# Patient Record
Sex: FEMALE | Race: WHITE | NOT HISPANIC OR LATINO | URBAN - METROPOLITAN AREA
[De-identification: names, ages, dates, MRNs, and addresses within clinical notes are randomized per-mention and may not be internally consistent; named-entity substitution may affect disease eponyms.]

---

## 2023-07-25 ENCOUNTER — HOSPITAL ENCOUNTER (OUTPATIENT)
Dept: DATA CONVERSION | Facility: HOSPITAL | Age: 48
Discharge: HOME | End: 2023-07-26
Attending: EMERGENCY MEDICINE

## 2023-07-25 DIAGNOSIS — R05.9 COUGH, UNSPECIFIED: ICD-10-CM

## 2023-07-25 DIAGNOSIS — R91.8 OTHER NONSPECIFIC ABNORMAL FINDING OF LUNG FIELD: ICD-10-CM

## 2023-07-25 DIAGNOSIS — R09.81 NASAL CONGESTION: ICD-10-CM

## 2023-07-25 DIAGNOSIS — U07.1 COVID-19: Primary | ICD-10-CM

## 2023-07-25 LAB
EUA DISCLAIMER: ABNORMAL
FLUAV RNA NPH QL NAA+PROBE: NEGATIVE
FLUBV RNA NPH QL NAA+PROBE: NEGATIVE
SARS-COV-2 RNA SPEC QL NAA+PROBE: ABNORMAL

## 2023-08-23 PROBLEM — G43.909 MIGRAINE: Status: ACTIVE | Noted: 2023-08-23

## 2023-08-23 PROBLEM — J44.9 COPD (CHRONIC OBSTRUCTIVE PULMONARY DISEASE) (MULTI): Status: ACTIVE | Noted: 2023-08-23

## 2023-08-23 PROBLEM — F41.9 ANXIETY: Status: ACTIVE | Noted: 2023-08-23

## 2023-08-23 PROBLEM — K92.2 GASTROINTESTINAL HEMORRHAGE: Status: ACTIVE | Noted: 2023-08-23

## 2023-08-23 PROBLEM — N91.2 AMENORRHEA: Status: ACTIVE | Noted: 2023-08-23

## 2023-08-23 PROBLEM — K29.60 EROSIVE GASTRITIS: Status: ACTIVE | Noted: 2023-08-23

## 2023-08-23 PROBLEM — H52.10 HIGH MYOPIA: Status: ACTIVE | Noted: 2023-08-23

## 2023-08-23 PROBLEM — I10 ESSENTIAL HYPERTENSION: Status: ACTIVE | Noted: 2023-08-23

## 2023-08-23 PROBLEM — R09.89 ALTERED CARDIOPULMONARY TISSUE PERFUSION: Status: ACTIVE | Noted: 2023-08-23

## 2023-08-23 PROBLEM — N96 RECURRENT PREGNANCY LOSS WITHOUT CURRENT PREGNANCY: Status: ACTIVE | Noted: 2023-08-23

## 2023-08-23 PROBLEM — R91.8 INFILTRATE OF LUNG PRESENT ON IMAGING STUDY: Status: ACTIVE | Noted: 2023-08-23

## 2023-08-23 PROBLEM — R06.03 RESPIRATORY DISTRESS: Status: ACTIVE | Noted: 2023-08-23

## 2023-08-23 PROBLEM — H04.123 BILATERAL DRY EYES: Status: ACTIVE | Noted: 2023-08-23

## 2023-08-23 PROBLEM — D64.9 ANEMIA: Status: ACTIVE | Noted: 2023-08-23

## 2023-08-23 PROBLEM — R55 VASOVAGAL SYNCOPE: Status: ACTIVE | Noted: 2023-08-23

## 2023-08-23 PROBLEM — N39.3 STRESS INCONTINENCE OF URINE: Status: ACTIVE | Noted: 2023-08-23

## 2023-08-23 RX ORDER — RAMIPRIL 10 MG/1
CAPSULE ORAL
COMMUNITY
Start: 2013-11-24 | End: 2023-10-13 | Stop reason: ALTCHOICE

## 2023-08-23 RX ORDER — DICYCLOMINE HYDROCHLORIDE 10 MG/1
2 CAPSULE ORAL 3 TIMES DAILY
COMMUNITY
End: 2023-10-13 | Stop reason: ALTCHOICE

## 2023-08-23 RX ORDER — IPRATROPIUM BROMIDE AND ALBUTEROL SULFATE 2.5; .5 MG/3ML; MG/3ML
SOLUTION RESPIRATORY (INHALATION) 4 TIMES DAILY PRN
COMMUNITY
Start: 2021-11-19 | End: 2023-10-13 | Stop reason: ALTCHOICE

## 2023-08-23 RX ORDER — ALBUTEROL SULFATE 90 UG/1
2 AEROSOL, METERED RESPIRATORY (INHALATION) 4 TIMES DAILY PRN
COMMUNITY
End: 2024-04-05 | Stop reason: WASHOUT

## 2023-08-23 RX ORDER — IBUPROFEN 600 MG/1
1 TABLET ORAL EVERY 8 HOURS PRN
COMMUNITY

## 2023-08-23 RX ORDER — ASCORBIC ACID 500 MG
TABLET ORAL
COMMUNITY
Start: 2023-04-21

## 2023-08-23 RX ORDER — HYDROCHLOROTHIAZIDE 25 MG/1
TABLET ORAL
COMMUNITY
Start: 2013-11-06 | End: 2023-10-13 | Stop reason: ALTCHOICE

## 2023-08-23 RX ORDER — MELOXICAM 7.5 MG/1
TABLET ORAL
COMMUNITY
Start: 2014-01-14 | End: 2023-10-13 | Stop reason: ALTCHOICE

## 2023-08-23 RX ORDER — LORATADINE 10 MG/1
1 TABLET ORAL DAILY
COMMUNITY
End: 2023-08-23 | Stop reason: SDUPTHER

## 2023-08-23 RX ORDER — AZITHROMYCIN 250 MG/1
1 TABLET, FILM COATED ORAL DAILY
COMMUNITY
End: 2023-10-13 | Stop reason: ALTCHOICE

## 2023-08-23 RX ORDER — PANTOPRAZOLE SODIUM 40 MG/1
1 TABLET, DELAYED RELEASE ORAL DAILY
COMMUNITY
Start: 2023-03-21

## 2023-08-23 RX ORDER — LISINOPRIL 20 MG/1
1 TABLET ORAL DAILY
COMMUNITY
End: 2023-10-13 | Stop reason: SDUPTHER

## 2023-08-23 RX ORDER — NIRMATRELVIR AND RITONAVIR 300-100 MG
1 KIT ORAL 2 TIMES DAILY
COMMUNITY
End: 2023-10-13 | Stop reason: ALTCHOICE

## 2023-08-23 RX ORDER — PEN NEEDLE, DIABETIC 31 GX5/16"
NEEDLE, DISPOSABLE MISCELLANEOUS
COMMUNITY
Start: 2021-11-19 | End: 2023-10-13 | Stop reason: ALTCHOICE

## 2023-08-23 RX ORDER — LORATADINE 10 MG/1
1 TABLET ORAL DAILY
COMMUNITY

## 2023-10-08 NOTE — PROGRESS NOTES
HPI:       Hypertension:          The patient has no complaints.  Labwork done this year all normal         The patients cardiovascular risk factors include:         Cardiac Risk Factors  Age > 45-male, > 55-female:  NO   Smoking:   NO   Sig. family hx of CHD*:  NO   Hypertension:   YES  +1   Diabetes:   NO   HDL < 35:   NO   HDL > 59:   NO   Total: 1     *- Sig. family h/o CHD per NCEP = MI or sudden death at <56yo in   father or other 1st-degree male relative, or <66yo in mother or   other 1st-degree female relative           The patients adherence to the treatment regimen is Excellent         Responses to the medications has been Excellent    Hyperlipidemia:   The patient does not use medications that may worsen dyslipidemias (corticosteroids, progestins, anabolic steroids, diuretics, beta-blockers, amiodarone, cyclosporine, olanzapine). The patient exercises intermittently.  The patient is not known to have coexisting coronary artery disease.    MEDICATIONS:   Current Outpatient Medications   Medication Sig Dispense Refill    ascorbic acid (Vitamin C) 500 mg tablet Super C Complex      ibuprofen 600 mg tablet Take 1 tablet (600 mg) by mouth every 8 hours if needed (pain).      loratadine (Claritin) 10 mg tablet Take 1 tablet (10 mg) by mouth once daily. For 30 days.      pantoprazole (ProtoNix) 40 mg EC tablet Take 1 tablet (40 mg) by mouth once daily. 30 min before 1st meal of the day.  For 90 days.      albuterol 90 mcg/actuation inhaler Inhale 2 puffs 4 times a day as needed for shortness of breath.      ASPIRIN ORAL 81 mg tabs.      lisinopril 20 mg tablet Take 1 tablet (20 mg) by mouth once daily. For 90 days. 90 tablet 1     No current facility-administered medications for this visit.     ALLERGIES:   Allergies   Allergen Reactions    Albuterol Sulfate Other     Other reaction: shakiness     MEDICAL HISTORY:   Past Medical History:   Diagnosis Date    COVID-19 long hauler manifesting chronic dyspnea      hypoxemia from 9/2021 to 6/2022 requiring O2.    Erosive gastritis     Essential hypertension     History of pulmonary embolus (PE) 10/2022    from covid 19 while in hospital    Migraines     Pneumonia due to COVID-19 virus 07/2023    did not need hospitalization    Prediabetes     Stress incontinence      FAMILY HISTORY:   Family History   Problem Relation Name Age of Onset    Hypertension Mother      Diabetes Mother      Colon polyps Father          benign    Other (diagnosed with Other conditions influening health) Father      Asthma Brother      No Known Problems Daughter      No Known Problems Daughter      No Known Problems Daughter      No Known Problems Son      Breast cancer Paternal Grandmother      Prostate cancer Paternal Grandfather      Heart attack Other paternal uncle     Cancer Neg Hx          No new cases of breast cancer or gyn cancer or colon cancer in family since last exam.     SOCIAL HISTORY:   Social History     Tobacco Use    Smoking status: Never     Passive exposure: Past    Smokeless tobacco: Never   Vaping Use    Vaping Use: Never used   Substance Use Topics    Alcohol use: Yes     Alcohol/week: 2.0 standard drinks of alcohol     Types: 2 Shots of liquor per week    Drug use: Never         ROS:      CONSTITUTION:  alert and oriented     OPHTHALMOLOGY:          no Change in vision.         CARDIOLOGY:          no Chest pain.  no Dyspnea on exertion.  no Shortness of breath.         ENDOCRINOLOGY:          no Excessive thirst.  no Excessive urination.  no Fatigue.  no Skin changes.  no Weight gain.  no Weight loss.         SKIN:          no Healing problems.         NEUROLOGY:          no Loss of sensation in specific body area.  no Burning pain in feet.  no Peripheral neuropathy.  no Tingling/numbness.    LABS:   Lab Results   Component Value Date    GLUCOSE 120 (H) 07/24/2023    CALCIUM 9.1 04/21/2023     04/21/2023    K 4.1 04/21/2023    CO2 26 04/21/2023     04/21/2023     BUN 13 04/21/2023    CREATININE 0.9 04/21/2023      Lab Results   Component Value Date    CHOL 190 04/21/2023    CHOL 166 09/30/2019     Lab Results   Component Value Date    HDL 47 (L) 04/21/2023    HDL 45 (L) 09/30/2019     Lab Results   Component Value Date    LDLCALC 113 04/21/2023    LDLCALC 94 09/30/2019     Lab Results   Component Value Date    TRIG 151 (H) 04/21/2023    TRIG 137 09/30/2019     Lab Results   Component Value Date    ALBUR 14 04/21/2023      Lab Results   Component Value Date    HGBA1C 6.2 (H) 07/24/2023         VITAL SIGNS:  /84   Pulse 65   Wt 93.5 kg (206 lb 3.2 oz)   LMP  (Within Months)   SpO2 96%   BMI 36.53 kg/m²   General Appearance: awake, alert, oriented, in no acute distress  Lungs: Normal expansion.  Clear to auscultation.  No rales, rhonchi, or wheezing.  Heart: Heart sounds are normal.  Regular rate and rhythm without murmur, gallop or rub.  Extremities: Extremities warm to touch, pink, with no edema.  Neurologic: Alert and oriented x 3, gait normal., reflexes normal and symmetric, strength and  sensation grossly normal    Pat was seen today for hypertension.  Diagnoses and all orders for this visit:  Primary hypertension (Primary)  -     lisinopril 20 mg tablet; Take 1 tablet (20 mg) by mouth once daily. For 90 days.  -     Follow Up In Primary Care - Established; Future  Prediabetes  Comments:  cont with diet and exercise  Encounter for screening mammogram for malignant neoplasm of breast  Comments:  also needs pap smear and will f/u for that  Orders:  -     BI mammo bilateral screening tomosynthesis; Future

## 2023-10-13 ENCOUNTER — OFFICE VISIT (OUTPATIENT)
Dept: PRIMARY CARE | Facility: CLINIC | Age: 48
End: 2023-10-13
Payer: COMMERCIAL

## 2023-10-13 VITALS
BODY MASS INDEX: 36.53 KG/M2 | HEART RATE: 65 BPM | OXYGEN SATURATION: 96 % | DIASTOLIC BLOOD PRESSURE: 84 MMHG | WEIGHT: 206.2 LBS | SYSTOLIC BLOOD PRESSURE: 118 MMHG

## 2023-10-13 DIAGNOSIS — Z12.31 ENCOUNTER FOR SCREENING MAMMOGRAM FOR MALIGNANT NEOPLASM OF BREAST: ICD-10-CM

## 2023-10-13 DIAGNOSIS — R73.03 PREDIABETES: ICD-10-CM

## 2023-10-13 DIAGNOSIS — I10 PRIMARY HYPERTENSION: Primary | ICD-10-CM

## 2023-10-13 PROCEDURE — 1036F TOBACCO NON-USER: CPT | Performed by: FAMILY MEDICINE

## 2023-10-13 PROCEDURE — 3074F SYST BP LT 130 MM HG: CPT | Performed by: FAMILY MEDICINE

## 2023-10-13 PROCEDURE — 3079F DIAST BP 80-89 MM HG: CPT | Performed by: FAMILY MEDICINE

## 2023-10-13 PROCEDURE — 99214 OFFICE O/P EST MOD 30 MIN: CPT | Performed by: FAMILY MEDICINE

## 2023-10-13 RX ORDER — LISINOPRIL 20 MG/1
20 TABLET ORAL DAILY
Qty: 90 TABLET | Refills: 1 | Status: SHIPPED | OUTPATIENT
Start: 2023-10-13

## 2023-10-13 ASSESSMENT — PATIENT HEALTH QUESTIONNAIRE - PHQ9
SUM OF ALL RESPONSES TO PHQ9 QUESTIONS 1 AND 2: 2
2. FEELING DOWN, DEPRESSED OR HOPELESS: SEVERAL DAYS
1. LITTLE INTEREST OR PLEASURE IN DOING THINGS: SEVERAL DAYS
10. IF YOU CHECKED OFF ANY PROBLEMS, HOW DIFFICULT HAVE THESE PROBLEMS MADE IT FOR YOU TO DO YOUR WORK, TAKE CARE OF THINGS AT HOME, OR GET ALONG WITH OTHER PEOPLE: NOT DIFFICULT AT ALL

## 2023-10-23 NOTE — PROGRESS NOTES
BREAST & [facility]YN.  HPI:     GYN History:          Menarche at age 12 years         Menses last menstrual period in July 2023...not menopausal yet         Menopause not  yet         H/o STD No.          H/o abnormal PAP no         Contraception Nothing         Vasomotor symptoms decreased libido, hot flashes.          LMP july 2023          Last Pap pap and hpv done 2018          Calcium intake adequate. Vitamin D intake at 800-1000 IU/day..          Bone DensityNo         Last Mammogram 12/2022          H/o Gyn Surgery No         Sexual partner same     Breast Complaint:          c/o Due for breast screening. Order for mammo put in 2 wks ago but pt has not heard from anyone to schedule.         Denies : concerns.     MEDICAL HISTORY:   Past Medical History:   Diagnosis Date    COVID-19 long hauler manifesting chronic dyspnea     hypoxemia from 9/2021 to 6/2022 requiring O2.    Erosive gastritis     Essential hypertension     History of pulmonary embolus (PE) 10/2022    from covid 19 while in hospital    Migraines     Pneumonia due to COVID-19 virus 07/2023    did not need hospitalization    Prediabetes     Stress incontinence      MEDICATIONS:   Current Outpatient Medications   Medication Sig Dispense Refill    ascorbic acid (Vitamin C) 500 mg tablet Super C Complex      ibuprofen 600 mg tablet Take 1 tablet (600 mg) by mouth every 8 hours if needed (pain).      lisinopril 20 mg tablet Take 1 tablet (20 mg) by mouth once daily. For 90 days. 90 tablet 1    loratadine (Claritin) 10 mg tablet Take 1 tablet (10 mg) by mouth once daily. For 30 days.      pantoprazole (ProtoNix) 40 mg EC tablet Take 1 tablet (40 mg) by mouth once daily. 1 every other day      albuterol 90 mcg/actuation inhaler Inhale 2 puffs 4 times a day as needed for shortness of breath.      ASPIRIN ORAL 81 mg tabs.       No current facility-administered medications for this visit.     ALLERGIES:   Allergies   Allergen Reactions    Albuterol Sulfate Other      Other reaction: shakiness     FAMILY HISTORY:   Family History   Problem Relation Name Age of Onset    Hypertension Mother      Diabetes Mother      Colon polyps Father          benign    Other (diagnosed with Other conditions influening health) Father      Asthma Brother      No Known Problems Daughter      No Known Problems Daughter      No Known Problems Daughter      No Known Problems Son      Breast cancer Paternal Grandmother      Prostate cancer Paternal Grandfather      Heart attack Other paternal uncle     Cancer Neg Hx          No new cases of breast cancer or gyn cancer or colon cancer in family since last exam.     SOCIAL HISTORY:   Social History     Tobacco Use    Smoking status: Never     Passive exposure: Past    Smokeless tobacco: Never   Vaping Use    Vaping Use: Never used   Substance Use Topics    Alcohol use: Yes     Alcohol/week: 2.0 standard drinks of alcohol     Types: 2 Shots of liquor per week    Drug use: Never       ROS:       CONSTITUTIONAL:  NAD, ALERT AND ORIENTED X 3, WELL DEVELOPED     GASTROENTEROLOGY:          no Blood in stool.  no Change in bowel habits.  no Change in stool.         FEMALE REPRODUCTIVE:          See HPI for details.  no Breast symptoms.  no Abnormal vaginal discharge.  no Dysuria.  +Hot flashes.  + Skipping periods.  no Vaginal spotting.        VITAL SIGNS:  /88   Pulse 60   Wt 93.3 kg (205 lb 9.6 oz)   LMP 07/29/2003   SpO2 99%   BMI 36.42 kg/m²     PE:  General:   alert, appears stated age, and cooperative           Abdomen: soft, non-tender, without masses or organomegaly   Vulva: normal   Vagina: normal mucosa   Cervix: no bleeding following Pap, no cervical motion tenderness, and no lesions   Uterus: normal size   Adnexa: normal adnexa and no mass, fullness, tenderness       Pat was seen today for gynecologic exam.  Diagnoses and all orders for this visit:  Well female exam with routine gynecological exam (Primary)  Screening for cervical  cancer  -     THINPREP PAP TEST  Screening for HPV (human papillomavirus)  -     THINPREP PAP TEST

## 2023-10-27 ENCOUNTER — PROCEDURE VISIT (OUTPATIENT)
Dept: PRIMARY CARE | Facility: CLINIC | Age: 48
End: 2023-10-27
Payer: COMMERCIAL

## 2023-10-27 VITALS
WEIGHT: 205.6 LBS | BODY MASS INDEX: 36.42 KG/M2 | DIASTOLIC BLOOD PRESSURE: 88 MMHG | HEART RATE: 60 BPM | SYSTOLIC BLOOD PRESSURE: 124 MMHG | OXYGEN SATURATION: 99 %

## 2023-10-27 DIAGNOSIS — Z01.419 WELL FEMALE EXAM WITH ROUTINE GYNECOLOGICAL EXAM: Primary | ICD-10-CM

## 2023-10-27 DIAGNOSIS — Z12.4 SCREENING FOR CERVICAL CANCER: ICD-10-CM

## 2023-10-27 DIAGNOSIS — Z11.51 SCREENING FOR HPV (HUMAN PAPILLOMAVIRUS): ICD-10-CM

## 2023-10-27 PROCEDURE — 99396 PREV VISIT EST AGE 40-64: CPT | Performed by: FAMILY MEDICINE

## 2023-10-27 PROCEDURE — 88175 CYTOPATH C/V AUTO FLUID REDO: CPT | Mod: TC | Performed by: FAMILY MEDICINE

## 2023-10-27 PROCEDURE — 87624 HPV HI-RISK TYP POOLED RSLT: CPT | Performed by: FAMILY MEDICINE

## 2023-10-27 ASSESSMENT — PAIN SCALES - GENERAL: PAINLEVEL: 6

## 2023-10-27 ASSESSMENT — PATIENT HEALTH QUESTIONNAIRE - PHQ9
1. LITTLE INTEREST OR PLEASURE IN DOING THINGS: NOT AT ALL
SUM OF ALL RESPONSES TO PHQ9 QUESTIONS 1 AND 2: 0
2. FEELING DOWN, DEPRESSED OR HOPELESS: NOT AT ALL

## 2023-11-14 LAB
CYTOLOGY CMNT CVX/VAG CYTO-IMP: NORMAL
HPV HR 12 DNA GENITAL QL NAA+PROBE: NEGATIVE
HPV HR GENOTYPES PNL CVX NAA+PROBE: NEGATIVE
HPV16 DNA SPEC QL NAA+PROBE: NEGATIVE
HPV18 DNA SPEC QL NAA+PROBE: NEGATIVE
LAB AP CONTRACEPTIVE HISTORY: NORMAL
LAB AP HISTORY OF MALIGNANCY: NORMAL
LAB AP HPV GENOTYPE QUESTION: YES
LAB AP HPV HR: NORMAL
LAB AP PREVIOUS ABNORMAL HISTORY: NORMAL
LAB AP TREATMENT HISTORY: NORMAL
LABORATORY COMMENT REPORT: NORMAL
LMP START DATE: NORMAL
MENSTRUAL HX REPORTED: NORMAL
PATH REPORT.TOTAL CANCER: NORMAL

## 2023-12-29 ENCOUNTER — ANCILLARY PROCEDURE (OUTPATIENT)
Dept: RADIOLOGY | Facility: CLINIC | Age: 48
End: 2023-12-29
Payer: COMMERCIAL

## 2023-12-29 VITALS — BODY MASS INDEX: 34.32 KG/M2 | WEIGHT: 206 LBS | HEIGHT: 65 IN

## 2023-12-29 DIAGNOSIS — Z12.31 ENCOUNTER FOR SCREENING MAMMOGRAM FOR MALIGNANT NEOPLASM OF BREAST: ICD-10-CM

## 2023-12-29 PROCEDURE — 77067 SCR MAMMO BI INCL CAD: CPT

## 2024-03-21 PROBLEM — R91.8 INFILTRATE OF LUNG PRESENT ON IMAGING STUDY: Status: RESOLVED | Noted: 2023-08-23 | Resolved: 2024-03-21

## 2024-03-21 PROBLEM — R09.89 ALTERED CARDIOPULMONARY TISSUE PERFUSION: Status: RESOLVED | Noted: 2023-08-23 | Resolved: 2024-03-21

## 2024-03-21 PROBLEM — R06.03 RESPIRATORY DISTRESS: Status: RESOLVED | Noted: 2023-08-23 | Resolved: 2024-03-21

## 2024-03-21 PROBLEM — R73.03 PREDIABETES: Status: ACTIVE | Noted: 2024-03-21

## 2024-03-21 NOTE — PROGRESS NOTES
HPI:       Hypertension:          The patient has no complaints except for left shoulder pain after a fall in Dec '23.  Has pain when trying to abduct/flex arm away from side of body.         The patients cardiovascular risk factors include:         Cardiac Risk Factors  Age > 45-male, > 55-female:  NO   Smoking:   NO   Sig. family hx of CHD*:  NO   Hypertension:   YES  +1   Diabetes:   NO   HDL < 35:   NO   HDL > 59:   NO   Total: 1     *- Sig. family h/o CHD per NCEP = MI or sudden death at <56yo in   father or other 1st-degree male relative, or <66yo in mother or   other 1st-degree female relative           The patients adherence to the treatment regimen is Excellent         Responses to the medications has been Excellent    Hyperlipidemia:   The patient does not use medications that may worsen dyslipidemias (corticosteroids, progestins, anabolic steroids, diuretics, beta-blockers, amiodarone, cyclosporine, olanzapine). The patient exercises intermittently.  The patient is not known to have coexisting coronary artery disease.  She is not on any statins  MEDICATIONS:   Current Outpatient Medications   Medication Sig Dispense Refill    ascorbic acid (Vitamin C) 500 mg tablet Super C Complex      ASPIRIN ORAL 81 mg tabs.      ibuprofen 600 mg tablet Take 1 tablet (600 mg) by mouth every 8 hours if needed (pain).      lisinopril 20 mg tablet Take 1 tablet (20 mg) by mouth once daily. For 90 days. 90 tablet 1    loratadine (Claritin) 10 mg tablet Take 1 tablet (10 mg) by mouth once daily. For 30 days.      pantoprazole (ProtoNix) 40 mg EC tablet Take 1 tablet (40 mg) by mouth once daily. 1 every other day       No current facility-administered medications for this visit.     ALLERGIES:   Allergies   Allergen Reactions    Albuterol Sulfate Other     Other reaction: shakiness     MEDICAL HISTORY:   Past Medical History:   Diagnosis Date    COVID-19 long hauler manifesting chronic dyspnea     hypoxemia from 9/2021 to  6/2022 requiring O2.    Erosive gastritis     Essential hypertension     History of pulmonary embolus (PE) 10/2022    from covid 19 while in hospital    Migraines     Pneumonia due to COVID-19 virus 07/2023    did not need hospitalization    Prediabetes     Prediabetes     Stress incontinence      FAMILY HISTORY:   Family History   Problem Relation Name Age of Onset    Hypertension Mother      Diabetes Mother      Colon polyps Father          benign    Other (diagnosed with Other conditions influening health) Father      Asthma Brother      No Known Problems Daughter      No Known Problems Daughter      No Known Problems Daughter      No Known Problems Son      Breast cancer Paternal Grandmother      Prostate cancer Paternal Grandfather      Heart attack Other paternal uncle     Cancer Neg Hx          No new cases of breast cancer or gyn cancer or colon cancer in family since last exam.     SOCIAL HISTORY:   Social History     Tobacco Use    Smoking status: Never     Passive exposure: Past    Smokeless tobacco: Never   Vaping Use    Vaping Use: Never used   Substance Use Topics    Alcohol use: Yes     Alcohol/week: 2.0 standard drinks of alcohol     Types: 2 Shots of liquor per week    Drug use: Never         ROS:      CONSTITUTION:  alert and oriented     OPHTHALMOLOGY:          no Change in vision.         CARDIOLOGY:          no Chest pain.  no Dyspnea on exertion.  no Shortness of breath.         ENDOCRINOLOGY:          no Excessive thirst.  no Excessive urination.  no Fatigue.  no Skin changes.  no Weight gain.  no Weight loss.         SKIN:          no Healing problems.         NEUROLOGY:          no Loss of sensation in specific body area.  no Burning pain in feet.  no Peripheral neuropathy.  no Tingling/numbness.    LABS: due    VITAL SIGNS:  /74   Pulse 76   Wt 95.3 kg (210 lb 3.2 oz)   SpO2 98%   BMI 34.98 kg/m²     General Appearance: awake, alert, oriented, in no acute distress  Lungs: Normal  expansion.  Clear to auscultation.  No rales, rhonchi, or wheezing.  Heart: Heart sounds are normal.  Regular rate and rhythm without murmur, gallop or rub.  Extremities: Extremities warm to touch, pink, with no edema.  Neurologic: Alert and oriented x 3, gait normal., reflexes normal and symmetric, strength and  sensation grossly normal    Pat was seen today for hypertension.  Diagnoses and all orders for this visit:  Essential hypertension (Primary)  Comments:  cont lisinopril  Orders:  -     Albumin , Urine Random; Future  -     Basic Metabolic Panel; Future  -     Lipid Panel; Future  -     Urinalysis with Reflex Microscopic; Future  -     Follow Up In Primary Care - Established  Prediabetes  -     Hemoglobin A1C; Future  Anemia, unspecified type  -     CBC; Future  Chronic left shoulder pain  -     XR shoulder left 2+ views; Future  Need for vaccination  -     Tdap vaccine, age 7 years and older  (BOOSTRIX)  -     Pneumococcal conjugate vaccine, 20-valent (PREVNAR 20)

## 2024-04-05 ENCOUNTER — HOSPITAL ENCOUNTER (OUTPATIENT)
Dept: RADIOLOGY | Facility: HOSPITAL | Age: 49
Discharge: HOME | End: 2024-04-05
Payer: COMMERCIAL

## 2024-04-05 ENCOUNTER — LAB (OUTPATIENT)
Dept: LAB | Facility: LAB | Age: 49
End: 2024-04-05
Payer: COMMERCIAL

## 2024-04-05 ENCOUNTER — OFFICE VISIT (OUTPATIENT)
Dept: PRIMARY CARE | Facility: CLINIC | Age: 49
End: 2024-04-05
Payer: COMMERCIAL

## 2024-04-05 VITALS
SYSTOLIC BLOOD PRESSURE: 132 MMHG | WEIGHT: 210.2 LBS | BODY MASS INDEX: 34.98 KG/M2 | OXYGEN SATURATION: 98 % | HEART RATE: 76 BPM | DIASTOLIC BLOOD PRESSURE: 74 MMHG

## 2024-04-05 DIAGNOSIS — D64.9 ANEMIA, UNSPECIFIED TYPE: ICD-10-CM

## 2024-04-05 DIAGNOSIS — M25.512 CHRONIC LEFT SHOULDER PAIN: ICD-10-CM

## 2024-04-05 DIAGNOSIS — I10 ESSENTIAL HYPERTENSION: Primary | ICD-10-CM

## 2024-04-05 DIAGNOSIS — R73.03 PREDIABETES: ICD-10-CM

## 2024-04-05 DIAGNOSIS — Z23 NEED FOR VACCINATION: ICD-10-CM

## 2024-04-05 DIAGNOSIS — R31.29 OTHER MICROSCOPIC HEMATURIA: Primary | ICD-10-CM

## 2024-04-05 DIAGNOSIS — G89.29 CHRONIC LEFT SHOULDER PAIN: ICD-10-CM

## 2024-04-05 DIAGNOSIS — I10 ESSENTIAL HYPERTENSION: ICD-10-CM

## 2024-04-05 DIAGNOSIS — R80.9 ALBUMINURIA: ICD-10-CM

## 2024-04-05 PROBLEM — K92.2 GASTROINTESTINAL HEMORRHAGE: Status: RESOLVED | Noted: 2023-08-23 | Resolved: 2024-04-05

## 2024-04-05 LAB
ANION GAP SERPL CALC-SCNC: 13 MMOL/L (ref 10–20)
APPEARANCE UR: ABNORMAL
BILIRUB UR STRIP.AUTO-MCNC: NEGATIVE MG/DL
BUN SERPL-MCNC: 19 MG/DL (ref 6–23)
CALCIUM SERPL-MCNC: 9.4 MG/DL (ref 8.6–10.3)
CHLORIDE SERPL-SCNC: 103 MMOL/L (ref 98–107)
CHOLEST SERPL-MCNC: 196 MG/DL (ref 0–199)
CHOLESTEROL/HDL RATIO: 4.1
CO2 SERPL-SCNC: 24 MMOL/L (ref 21–32)
COLOR UR: YELLOW
CREAT SERPL-MCNC: 0.82 MG/DL (ref 0.5–1.05)
CREAT UR-MCNC: 264.3 MG/DL (ref 20–320)
EGFRCR SERPLBLD CKD-EPI 2021: 88 ML/MIN/1.73M*2
ERYTHROCYTE [DISTWIDTH] IN BLOOD BY AUTOMATED COUNT: 12.5 % (ref 11.5–14.5)
EST. AVERAGE GLUCOSE BLD GHB EST-MCNC: 108 MG/DL
GLUCOSE SERPL-MCNC: 112 MG/DL (ref 74–99)
GLUCOSE UR STRIP.AUTO-MCNC: NEGATIVE MG/DL
HBA1C MFR BLD: 5.4 %
HCT VFR BLD AUTO: 47.1 % (ref 36–46)
HDLC SERPL-MCNC: 47.8 MG/DL
HGB BLD-MCNC: 15.5 G/DL (ref 12–16)
KETONES UR STRIP.AUTO-MCNC: NEGATIVE MG/DL
LDLC SERPL CALC-MCNC: 109 MG/DL
LEUKOCYTE ESTERASE UR QL STRIP.AUTO: ABNORMAL
MCH RBC QN AUTO: 30.6 PG (ref 26–34)
MCHC RBC AUTO-ENTMCNC: 32.9 G/DL (ref 32–36)
MCV RBC AUTO: 93 FL (ref 80–100)
MICROALBUMIN UR-MCNC: 21.7 MG/L
MICROALBUMIN/CREAT UR: 8.2 UG/MG CREAT
MUCOUS THREADS #/AREA URNS AUTO: ABNORMAL /LPF
NITRITE UR QL STRIP.AUTO: NEGATIVE
NON HDL CHOLESTEROL: 148 MG/DL (ref 0–149)
NRBC BLD-RTO: 0 /100 WBCS (ref 0–0)
PH UR STRIP.AUTO: 5 [PH]
PLATELET # BLD AUTO: 260 X10*3/UL (ref 150–450)
POTASSIUM SERPL-SCNC: 4.3 MMOL/L (ref 3.5–5.3)
PROT UR STRIP.AUTO-MCNC: ABNORMAL MG/DL
RBC # BLD AUTO: 5.07 X10*6/UL (ref 4–5.2)
RBC # UR STRIP.AUTO: NEGATIVE /UL
RBC #/AREA URNS AUTO: ABNORMAL /HPF
SODIUM SERPL-SCNC: 136 MMOL/L (ref 136–145)
SP GR UR STRIP.AUTO: 1.03
SQUAMOUS #/AREA URNS AUTO: ABNORMAL /HPF
TRIGL SERPL-MCNC: 195 MG/DL (ref 0–149)
UROBILINOGEN UR STRIP.AUTO-MCNC: <2 MG/DL
VLDL: 39 MG/DL (ref 0–40)
WBC # BLD AUTO: 7.6 X10*3/UL (ref 4.4–11.3)
WBC #/AREA URNS AUTO: ABNORMAL /HPF

## 2024-04-05 PROCEDURE — 80048 BASIC METABOLIC PNL TOTAL CA: CPT

## 2024-04-05 PROCEDURE — 99214 OFFICE O/P EST MOD 30 MIN: CPT | Performed by: FAMILY MEDICINE

## 2024-04-05 PROCEDURE — 81001 URINALYSIS AUTO W/SCOPE: CPT

## 2024-04-05 PROCEDURE — 82043 UR ALBUMIN QUANTITATIVE: CPT

## 2024-04-05 PROCEDURE — 73030 X-RAY EXAM OF SHOULDER: CPT | Mod: LEFT SIDE | Performed by: RADIOLOGY

## 2024-04-05 PROCEDURE — 1036F TOBACCO NON-USER: CPT | Performed by: FAMILY MEDICINE

## 2024-04-05 PROCEDURE — 80061 LIPID PANEL: CPT

## 2024-04-05 PROCEDURE — 85027 COMPLETE CBC AUTOMATED: CPT

## 2024-04-05 PROCEDURE — 90715 TDAP VACCINE 7 YRS/> IM: CPT | Performed by: FAMILY MEDICINE

## 2024-04-05 PROCEDURE — 73030 X-RAY EXAM OF SHOULDER: CPT | Mod: LT

## 2024-04-05 PROCEDURE — 3078F DIAST BP <80 MM HG: CPT | Performed by: FAMILY MEDICINE

## 2024-04-05 PROCEDURE — 3075F SYST BP GE 130 - 139MM HG: CPT | Performed by: FAMILY MEDICINE

## 2024-04-05 PROCEDURE — 82570 ASSAY OF URINE CREATININE: CPT

## 2024-04-05 PROCEDURE — 36415 COLL VENOUS BLD VENIPUNCTURE: CPT

## 2024-04-05 PROCEDURE — 83036 HEMOGLOBIN GLYCOSYLATED A1C: CPT

## 2024-04-05 PROCEDURE — 90677 PCV20 VACCINE IM: CPT | Performed by: FAMILY MEDICINE

## 2024-04-05 ASSESSMENT — ENCOUNTER SYMPTOMS
DEPRESSION: 0
LOSS OF SENSATION IN FEET: 0
OCCASIONAL FEELINGS OF UNSTEADINESS: 0

## 2024-04-05 ASSESSMENT — LIFESTYLE VARIABLES
HOW OFTEN DO YOU HAVE SIX OR MORE DRINKS ON ONE OCCASION: NEVER
HOW OFTEN DO YOU HAVE A DRINK CONTAINING ALCOHOL: MONTHLY OR LESS
SKIP TO QUESTIONS 9-10: 1
HOW MANY STANDARD DRINKS CONTAINING ALCOHOL DO YOU HAVE ON A TYPICAL DAY: 1 OR 2
AUDIT-C TOTAL SCORE: 1

## 2024-04-05 ASSESSMENT — PAIN SCALES - GENERAL: PAINLEVEL: 3

## 2024-04-05 ASSESSMENT — PATIENT HEALTH QUESTIONNAIRE - PHQ9
1. LITTLE INTEREST OR PLEASURE IN DOING THINGS: NOT AT ALL
2. FEELING DOWN, DEPRESSED OR HOPELESS: NOT AT ALL
SUM OF ALL RESPONSES TO PHQ9 QUESTIONS 1 AND 2: 0

## 2024-04-05 NOTE — RESULT ENCOUNTER NOTE
1. U/a with micro abnormal most likely due to being very concentrated and not drinking enough fluids; jamarcus again in 6 weeks  and drink more fluids before next test; order placed  2. Bmp and lipids normal-jamarcus 1 yr  3. Cbc normal; no further anemia

## 2024-06-08 ENCOUNTER — HOSPITAL ENCOUNTER (EMERGENCY)
Facility: HOSPITAL | Age: 49
Discharge: HOME | End: 2024-06-08
Payer: COMMERCIAL

## 2024-06-08 VITALS
DIASTOLIC BLOOD PRESSURE: 97 MMHG | HEIGHT: 63 IN | TEMPERATURE: 96.8 F | WEIGHT: 198.41 LBS | HEART RATE: 85 BPM | BODY MASS INDEX: 35.16 KG/M2 | SYSTOLIC BLOOD PRESSURE: 139 MMHG | RESPIRATION RATE: 18 BRPM | OXYGEN SATURATION: 95 %

## 2024-06-08 DIAGNOSIS — J01.00 ACUTE NON-RECURRENT MAXILLARY SINUSITIS: Primary | ICD-10-CM

## 2024-06-08 PROCEDURE — 99283 EMERGENCY DEPT VISIT LOW MDM: CPT

## 2024-06-08 RX ORDER — AMOXICILLIN AND CLAVULANATE POTASSIUM 875; 125 MG/1; MG/1
1 TABLET, FILM COATED ORAL EVERY 12 HOURS
Qty: 14 TABLET | Refills: 0 | Status: SHIPPED | OUTPATIENT
Start: 2024-06-08 | End: 2024-06-15

## 2024-06-08 ASSESSMENT — PAIN - FUNCTIONAL ASSESSMENT: PAIN_FUNCTIONAL_ASSESSMENT: 0-10

## 2024-06-08 ASSESSMENT — COLUMBIA-SUICIDE SEVERITY RATING SCALE - C-SSRS
1. IN THE PAST MONTH, HAVE YOU WISHED YOU WERE DEAD OR WISHED YOU COULD GO TO SLEEP AND NOT WAKE UP?: NO
6. HAVE YOU EVER DONE ANYTHING, STARTED TO DO ANYTHING, OR PREPARED TO DO ANYTHING TO END YOUR LIFE?: NO
2. HAVE YOU ACTUALLY HAD ANY THOUGHTS OF KILLING YOURSELF?: NO

## 2024-06-08 ASSESSMENT — PAIN SCALES - GENERAL: PAINLEVEL_OUTOF10: 0 - NO PAIN

## 2024-06-08 NOTE — DISCHARGE INSTRUCTIONS
Plan is symptomatic management: Encourage rest, nutrition, hydration, and use acetaminophen or ibuprofen for fever. Over the counter decongestants, antihistamines, saline rinses, and vitamin C are also acceptable for symptomatic relief.     Follow up with the primary doctor within the next 7 days.  Return immediately to the ED if you experience worsening shortness of breath or vomiting with inability to tolerate fluids.    The evidence indicates that the patient is very low risk for infectious process requiring antibiotics. Antibiotics have significant risks, and in this case, the risks of antibiotics or hospitalization for this issue likely exceed the benefit. It is, therefore, in the patient´s best interest not to take antibiotics or to be hospitalized for this issue at this time. I have discussed with the patient my clinical impression and the result of an evidence-based clinical evaluation to screen for pneumonia. The evidence shows that the risk for pneumonia is low. Although the risk of pneumonia has not been completely eliminated, the risks of antibiotics or hospitalization for this issue likely exceed any potential benefit, and the patient agrees with not pursuing antibiotic treatment or being hospitalized for this issue at this time.

## 2024-06-08 NOTE — ED PROVIDER NOTES
HPI   Chief Complaint   Patient presents with    Cough     Since Tuesday I have had a cough and sinus drainage        HPI  Patient is a 48-year-old otherwise healthy female who presents to ED for possible sinus infection x 5 days.  Patient reports sinus pressure and drainage, nasal congestion, productive cough, headache.  She denies any fever or chills, shortness of breath or chest pain, abdominal pain or NVD, urinary symptoms.  Patient states she has been medicating with over-the-counter cold medicine since onset of symptoms but reports little relief.                  Chance Coma Scale Score: 15                     Patient History   Past Medical History:   Diagnosis Date    COVID-19 long hauler manifesting chronic dyspnea     hypoxemia from 9/2021 to 6/2022 requiring O2.    Erosive gastritis     Essential hypertension     History of pulmonary embolus (PE) 10/2022    from covid 19 while in hospital    Migraines     Pneumonia due to COVID-19 virus 07/2023    did not need hospitalization    Prediabetes     Stress incontinence      Past Surgical History:   Procedure Laterality Date    CARPAL TUNNEL RELEASE Left     [facility]    COLONOSCOPY  03/2023    normal; jamarcus 10 yrs; [place]    DILATION AND CURETTAGE OF UTERUS      x 2 in 1992 and 2001    ESOPHAGOGASTRODUODENOSCOPY  03/2023    mild gastritis; eso dilated for dysphagia-jamarcus 3-5 yrs  [place]    WISDOM TOOTH EXTRACTION  1999     Family History   Problem Relation Name Age of Onset    Hypertension Mother      Diabetes Mother      Colon polyps Father          benign    Other (diagnosed with Other conditions influening health) Father      Asthma Brother      No Known Problems Daughter      No Known Problems Daughter      No Known Problems Daughter      No Known Problems Son      Breast cancer Paternal Grandmother      Prostate cancer Paternal Grandfather      Heart attack Other paternal uncle     Cancer Neg Hx          No new cases of breast cancer or gyn cancer or  colon cancer in family since last exam.     Social History     Tobacco Use    Smoking status: Never     Passive exposure: Past    Smokeless tobacco: Never   Vaping Use    Vaping status: Never Used   Substance Use Topics    Alcohol use: Yes     Alcohol/week: 2.0 standard drinks of alcohol     Types: 2 Shots of liquor per week    Drug use: Never       Physical Exam   ED Triage Vitals [06/08/24 1547]   Temperature Heart Rate Respirations BP   36 °C (96.8 °F) 85 18 (!) 139/97      Pulse Ox Temp Source Heart Rate Source Patient Position   95 % Temporal -- Sitting      BP Location FiO2 (%)     Left arm --       Physical Exam  Vitals reviewed.   Constitutional:       Appearance: Normal appearance.   HENT:      Head: Normocephalic and atraumatic.      Nose: Congestion and rhinorrhea present.   Eyes:      Extraocular Movements: Extraocular movements intact.   Cardiovascular:      Rate and Rhythm: Normal rate and regular rhythm.   Pulmonary:      Effort: Pulmonary effort is normal.      Breath sounds: Normal breath sounds.   Abdominal:      General: Abdomen is flat.      Palpations: Abdomen is soft.      Tenderness: There is no abdominal tenderness.   Musculoskeletal:         General: Normal range of motion.      Cervical back: Normal range of motion and neck supple.   Skin:     General: Skin is warm and dry.   Neurological:      General: No focal deficit present.      Mental Status: She is alert and oriented to person, place, and time.   Psychiatric:         Mood and Affect: Mood normal.         Behavior: Behavior normal.         ED Course & MDM   Diagnoses as of 06/08/24 1824   Acute non-recurrent maxillary sinusitis       Medical Decision Making  Parts of this chart have been completed using voice recognition software. Please excuse any errors of transcription.  My thought process and reason for plan has been formulated from the time that I saw the patient until the time of disposition and is not specific to one specific  moment during their visit and furthermore my MDM encompasses this entire chart and not only this text box.    HPI:   Detailed above.    Exam:   A medically appropriate exam performed, outlined above, given the known history and presentation.    History obtained from:   Patient    EKG/Cardiac monitor:       Social Determinants of Health considered during this visit:       Medications given during visit:  Medications - No data to display     Diagnostic/tests:  Labs Reviewed - No data to display   No orders to display        Differential Diagnosis:   As I have deemed necessary from their history, physical, and studies, I have considered the following diagnoses:     PNEUMONIA  ASTHMA  COVID-19  INFLUENZA  RSV  POSTNASAL DRIP SYNDROME  GERD  ACE INHIBITOR USE  HEART FAILURE  PULMONARY EMBOLISM  LUNG CANCER  CROUP    I completed a structured, evidence-based clinical evaluation to determine the need for antibiotics in this patient with clinical signs and symptoms of acute bronchitis. The evidence indicates that the patient is very low risk for infectious process requiring antibiotics. Antibiotics have significant risks, and in this case, the risks of antibiotics or hospitalization for this issue likely exceed the benefit. It is, therefore, in the patient´s best interest not to take antibiotics or to be hospitalized for this issue at this time. I have discussed with the patient my clinical impression and the result of an evidence-based clinical evaluation to screen for pneumonia. The evidence shows that the risk for pneumonia is low. Although the risk of pneumonia has not been completely eliminated, the risks of antibiotics or hospitalization for this issue likely exceed any potential benefit, and the patient agrees with not pursuing antibiotic treatment or being hospitalized for this issue at this time.    Consultations:      Procedures:      Critical Care:      Referrals:      Discharge Prescriptions:      MDM  Summary:  Likely has bacterial sinusitis.  Will prescribe Augmentin.  Patient will follow-up with PCP.  Return precautions were discussed.    We have discussed the diagnosis and risks, and we agree with discharging home to follow-up with appropriate physician as directed. We also discussed returning to the Emergency Department immediately if new or worsening symptoms occur. We have discussed the symptoms which are most concerning that necessitate immediate return. Pt symptoms have been well controlled here and the patient is safe for discharge with appropriate outpatient follow up. The patient has verbalized understanding to return to ER without delay for new or worsening pains or for any other symptoms or concerns. I utilized the discharge clinical management tool provided [employer] to help estimate risk of negative outcome for this patient.      Disposition:  The patient was discharged      Procedure  Procedures     Ishaan Jarrell PA-C  06/08/24 2702

## 2024-08-06 DIAGNOSIS — I10 PRIMARY HYPERTENSION: ICD-10-CM

## 2024-08-06 RX ORDER — LISINOPRIL 20 MG/1
20 TABLET ORAL DAILY
Qty: 90 TABLET | Refills: 0 | Status: SHIPPED | OUTPATIENT
Start: 2024-08-06

## 2024-08-18 NOTE — PROGRESS NOTES
HPI:       Hypertension:          The patient has no complaints         The patients cardiovascular risk factors include:         Cardiac Risk Factors  Age > 45-male, > 55-female:  NO   Smoking:   NO   Sig. family hx of CHD*:  NO   Hypertension:   YES  +1   Diabetes:   NO   HDL < 35:   NO   HDL > 59:   NO   Total: 1     *- Sig. family h/o CHD per NCEP = MI or sudden death at <56yo in   father or other 1st-degree male relative, or <64yo in mother or   other 1st-degree female relative           The patients adherence to the treatment regimen is Excellent         Responses to the medications has been Excellent    Hyperlipidemia:   The patient does not use medications that may worsen dyslipidemias (corticosteroids, progestins, anabolic steroids, diuretics, beta-blockers, amiodarone, cyclosporine, olanzapine). The patient exercises intermittently.  The patient is not known to have coexisting coronary artery disease.  She is not on any statins since 10 yr risk is only 2%      MEDICATIONS:   Current Outpatient Medications   Medication Sig Dispense Refill    ascorbic acid (Vitamin C) 500 mg tablet Super C Complex      ibuprofen 600 mg tablet Take 1 tablet (600 mg) by mouth every 8 hours if needed (pain).      lisinopril 20 mg tablet TAKE 1 TABLET BY MOUTH ONCE DAILY. 90 tablet 0    loratadine (Claritin) 10 mg tablet Take 1 tablet (10 mg) by mouth once daily. For 30 days.       No current facility-administered medications for this visit.     ALLERGIES:   Allergies   Allergen Reactions    Albuterol Sulfate Other     Other reaction: shakiness     MEDICAL HISTORY:   Past Medical History:   Diagnosis Date    COVID-19 long hauler manifesting chronic dyspnea     hypoxemia from 9/2021 to 6/2022 requiring O2.    Erosive gastritis     Essential hypertension     History of pulmonary embolus (PE) 10/2022    from covid 19 while in hospital    Migraines     Pneumonia due to COVID-19 virus 07/2023    did not need hospitalization     Prediabetes     Stress incontinence      FAMILY HISTORY:   Family History   Problem Relation Name Age of Onset    Hypertension Mother      Diabetes Mother      Colon polyps Father          benign    Other (diagnosed with Other conditions influening health) Father      Asthma Brother      No Known Problems Daughter      No Known Problems Daughter      No Known Problems Daughter      No Known Problems Son      Breast cancer Paternal Grandmother      Prostate cancer Paternal Grandfather      Heart attack Other paternal uncle     Cancer Neg Hx          No new cases of breast cancer or gyn cancer or colon cancer in family since last exam.     SOCIAL HISTORY:   Social History     Tobacco Use    Smoking status: Never     Passive exposure: Past    Smokeless tobacco: Never   Vaping Use    Vaping status: Never Used   Substance Use Topics    Alcohol use: Yes     Alcohol/week: 2.0 standard drinks of alcohol     Types: 2 Shots of liquor per week    Drug use: Never         ROS:      CONSTITUTION:  alert and oriented     OPHTHALMOLOGY:          no Change in vision.         CARDIOLOGY:          no Chest pain.  no Dyspnea on exertion.  no Shortness of breath.         ENDOCRINOLOGY:          no Excessive thirst.  no Excessive urination.  no Fatigue.  no Skin changes.  no Weight gain.  no Weight loss.         SKIN:          no Healing problems.         NEUROLOGY:          no Loss of sensation in specific body area.  no Burning pain in feet.  no Peripheral neuropathy.  no Tingling/numbness.    LABS:  Lab Results   Component Value Date    GLUCOSE 112 (H) 04/05/2024    CALCIUM 9.4 04/05/2024     04/05/2024    K 4.3 04/05/2024    CO2 24 04/05/2024     04/05/2024    BUN 19 04/05/2024    CREATININE 0.82 04/05/2024     Lab Results   Component Value Date    CHOL 196 04/05/2024    CHOL 190 04/21/2023    CHOL 166 09/30/2019     Lab Results   Component Value Date    HDL 47.8 04/05/2024    HDL 47 (L) 04/21/2023    HDL 45 (L)  "09/30/2019     Lab Results   Component Value Date    LDLCALC 109 (H) 04/05/2024    LDLCALC 113 04/21/2023    LDLCALC 94 09/30/2019     Lab Results   Component Value Date    TRIG 195 (H) 04/05/2024    TRIG 151 (H) 04/21/2023    TRIG 137 09/30/2019     No components found for: \"CHOLHDL\"   Latest Reference Range & Units 04/05/24 10:31   Albumin, Urine Random Not established mg/L 21.7   Creatinine, Urine Random 20.0 - 320.0 mg/dL 264.3   Albumin/Creatinine Ratio <30.0 ug/mg Creat 8.2     Lab Results   Component Value Date    HGBA1C 5.4 04/05/2024         VITAL SIGNS:  /74   Pulse 80   Wt 90.6 kg (199 lb 12.8 oz)   SpO2 98%   BMI 35.39 kg/m²     General Appearance: awake, alert, oriented, in no acute distress  Lungs: Normal expansion.  Clear to auscultation.  No rales, rhonchi, or wheezing.  Heart: Heart sounds are normal.  Regular rate and rhythm without murmur, gallop or rub.  Extremities: Extremities warm to touch, pink, with no edema.  Neurologic: Alert and oriented x 3, gait normal., reflexes normal and symmetric, strength and  sensation grossly normal    Pat was seen today for hypertension.  Diagnoses and all orders for this visit:  Essential hypertension (Primary)  Comments:  cont lisinopril  Orders:  -     Follow Up In Primary Care - Established; Future  Prediabetes  Comments:  cont diet/exercise  Right elbow pain  -     Referral to Orthopaedic Surgery; Future  Encounter for screening mammogram for malignant neoplasm of breast  -     BI mammo bilateral screening tomosynthesis; Future              "

## 2024-08-29 ENCOUNTER — TELEPHONE (OUTPATIENT)
Dept: PRIMARY CARE | Facility: CLINIC | Age: 49
End: 2024-08-29
Payer: COMMERCIAL

## 2024-08-29 NOTE — LETTER
Pat Watt  1975    8/29/2024    To Whom This May Concern:    My patient, Pat Watt, is unable to perform Jury Duty due to a mental or physical condition that renders the prospective juror unfit for jury service for the remainder of the jury year.    Should you have any questions please do not hesitate to call.    Thank you for your cooperation.    Sincerely,    Malia Macias MD

## 2024-08-29 NOTE — TELEPHONE ENCOUNTER
Patient requesting to be excused from jury duty due to having stress incontinence patient states she don't know if she would be able to sit long periods of time. Last seen in office 4/5/24 please advise

## 2024-09-27 ENCOUNTER — OFFICE VISIT (OUTPATIENT)
Dept: PRIMARY CARE | Facility: CLINIC | Age: 49
End: 2024-09-27
Payer: COMMERCIAL

## 2024-09-27 VITALS
WEIGHT: 199.8 LBS | BODY MASS INDEX: 35.39 KG/M2 | SYSTOLIC BLOOD PRESSURE: 128 MMHG | HEART RATE: 80 BPM | DIASTOLIC BLOOD PRESSURE: 74 MMHG | OXYGEN SATURATION: 98 %

## 2024-09-27 DIAGNOSIS — Z12.31 ENCOUNTER FOR SCREENING MAMMOGRAM FOR MALIGNANT NEOPLASM OF BREAST: ICD-10-CM

## 2024-09-27 DIAGNOSIS — R73.03 PREDIABETES: ICD-10-CM

## 2024-09-27 DIAGNOSIS — I10 ESSENTIAL HYPERTENSION: Primary | ICD-10-CM

## 2024-09-27 DIAGNOSIS — M25.521 RIGHT ELBOW PAIN: ICD-10-CM

## 2024-09-27 PROCEDURE — 99214 OFFICE O/P EST MOD 30 MIN: CPT | Performed by: FAMILY MEDICINE

## 2024-09-27 PROCEDURE — 3074F SYST BP LT 130 MM HG: CPT | Performed by: FAMILY MEDICINE

## 2024-09-27 PROCEDURE — 1036F TOBACCO NON-USER: CPT | Performed by: FAMILY MEDICINE

## 2024-09-27 PROCEDURE — 3078F DIAST BP <80 MM HG: CPT | Performed by: FAMILY MEDICINE

## 2024-09-27 ASSESSMENT — ENCOUNTER SYMPTOMS
DEPRESSION: 0
LOSS OF SENSATION IN FEET: 0
OCCASIONAL FEELINGS OF UNSTEADINESS: 0

## 2024-09-27 ASSESSMENT — PAIN SCALES - GENERAL: PAINLEVEL: 0-NO PAIN

## 2024-10-28 DIAGNOSIS — I10 PRIMARY HYPERTENSION: ICD-10-CM

## 2024-10-29 RX ORDER — LISINOPRIL 20 MG/1
20 TABLET ORAL DAILY
Qty: 90 TABLET | Refills: 1 | Status: SHIPPED | OUTPATIENT
Start: 2024-10-29

## 2024-12-30 ENCOUNTER — HOSPITAL ENCOUNTER (OUTPATIENT)
Dept: RADIOLOGY | Facility: CLINIC | Age: 49
Discharge: HOME | End: 2024-12-30
Payer: COMMERCIAL

## 2024-12-30 VITALS — BODY MASS INDEX: 35.25 KG/M2 | WEIGHT: 199 LBS

## 2024-12-30 DIAGNOSIS — Z12.31 ENCOUNTER FOR SCREENING MAMMOGRAM FOR MALIGNANT NEOPLASM OF BREAST: ICD-10-CM

## 2024-12-30 PROCEDURE — 77063 BREAST TOMOSYNTHESIS BI: CPT | Performed by: STUDENT IN AN ORGANIZED HEALTH CARE EDUCATION/TRAINING PROGRAM

## 2024-12-30 PROCEDURE — 77063 BREAST TOMOSYNTHESIS BI: CPT

## 2024-12-30 PROCEDURE — 77067 SCR MAMMO BI INCL CAD: CPT | Performed by: STUDENT IN AN ORGANIZED HEALTH CARE EDUCATION/TRAINING PROGRAM

## 2025-02-03 PROBLEM — R55 VASOVAGAL SYNCOPE: Status: RESOLVED | Noted: 2023-08-23 | Resolved: 2025-02-03

## 2025-02-03 PROBLEM — D64.9 ANEMIA: Status: RESOLVED | Noted: 2023-08-23 | Resolved: 2025-02-03

## 2025-02-03 NOTE — PROGRESS NOTES
HPI:       Hypertension:          The patient has no complaints         The patients cardiovascular risk factors include:         Cardiac Risk Factors  Age > 45-male, > 55-female:  NO   Smoking:   NO   Sig. family hx of CHD*:  NO   Hypertension:   YES  +1   Diabetes:   NO   HDL < 35:   NO   HDL > 59:   NO   Total: 1     *- Sig. family h/o CHD per NCEP = MI or sudden death at <54yo in   father or other 1st-degree male relative, or <64yo in mother or   other 1st-degree female relative           The patients adherence to the treatment regimen is Excellent         Responses to the medications has been Excellent    Hyperlipidemia:   The patient does not use medications that may worsen dyslipidemias (corticosteroids, progestins, anabolic steroids, diuretics, beta-blockers, amiodarone, cyclosporine, olanzapine). The patient exercises intermittently.  The patient is not known to have coexisting coronary artery disease.  She is not on any statins since 10 yr risk is only 2%    Pt may have sprained right ankle when she stepped into a pot hole a week ago.  Some pain at ankle with slight swelling  MEDICATIONS:   Current Outpatient Medications   Medication Sig Dispense Refill    ascorbic acid (Vitamin C) 500 mg tablet Super C Complex      ibuprofen 600 mg tablet Take 1 tablet (600 mg) by mouth every 8 hours if needed (pain).      lisinopril 20 mg tablet TAKE 1 TABLET BY MOUTH ONCE DAILY. 90 tablet 1    loratadine (Claritin) 10 mg tablet Take 1 tablet (10 mg) by mouth once daily. For 30 days.       No current facility-administered medications for this visit.     ALLERGIES:   Allergies   Allergen Reactions    Albuterol Sulfate Other     Other reaction: shakiness     MEDICAL HISTORY:   Past Medical History:   Diagnosis Date    COVID-19 long hauler manifesting chronic dyspnea     hypoxemia from 9/2021 to 6/2022 requiring O2.    Erosive gastritis     Essential hypertension     History of pulmonary embolus (PE) 10/2022    from covid  19 while in hospital    Migraines     Pneumonia due to COVID-19 virus 07/2023    did not need hospitalization    Prediabetes     Stress incontinence      FAMILY HISTORY:   Family History   Problem Relation Name Age of Onset    Hypertension Mother      Diabetes Mother      Colon polyps Father          benign    Other (diagnosed with Other conditions influening health) Father      Asthma Brother      No Known Problems Daughter      No Known Problems Daughter      No Known Problems Daughter      No Known Problems Son      Breast cancer Paternal Grandmother      Prostate cancer Paternal Grandfather      Heart attack Other paternal uncle     Cancer Neg Hx          No new cases of breast cancer or gyn cancer or colon cancer in family since last exam.     SOCIAL HISTORY:   Social History     Tobacco Use    Smoking status: Never     Passive exposure: Past    Smokeless tobacco: Never   Vaping Use    Vaping status: Never Used   Substance Use Topics    Alcohol use: Yes     Alcohol/week: 2.0 standard drinks of alcohol     Types: 2 Shots of liquor per week     Comment: occasional    Drug use: Never         ROS:      CONSTITUTION:  alert and oriented     OPHTHALMOLOGY:          no Change in vision.         CARDIOLOGY:          no Chest pain.  no Dyspnea on exertion.  no Shortness of breath.         ENDOCRINOLOGY:          no Excessive thirst.  no Excessive urination.  no Fatigue.  no Skin changes.  no Weight gain.  no Weight loss.         SKIN:          no Healing problems.         NEUROLOGY:          no Loss of sensation in specific body area.  no Burning pain in feet.  no Peripheral neuropathy.  no Tingling/numbness.    LABS:due    VITAL SIGNS:  /88   Pulse 60   Wt 92.2 kg (203 lb 3.2 oz)   LMP 02/06/2025 (Exact Date)   SpO2 96%   BMI 36.00 kg/m²     General Appearance: awake, alert, oriented, in no acute distress  Lungs: Normal expansion.  Clear to auscultation.  No rales, rhonchi, or wheezing.  Heart: Heart sounds  are normal.  Regular rate and rhythm without murmur, gallop or rub.  Extremities: Extremities warm to touch, pink, with no edema.  Neurologic: Alert and oriented x 3, gait normal., reflexes normal and symmetric, strength and  sensation grossly normal  Right ankle with pain with plantar and dorsi flexion and inversion/eversion.  Slight swelling anteriorly    Tammijo was seen today for hypertension.  Diagnoses and all orders for this visit:  Essential hypertension (Primary)  Comments:  cont lisinopril  Orders:  -     Albumin-Creatinine Ratio, Urine Random; Future  -     Basic Metabolic Panel; Future  -     Urinalysis with Reflex Microscopic; Future  -     Follow Up In Primary Care - Established  -     Albumin-Creatinine Ratio, Urine Random  -     Basic Metabolic Panel  -     Urinalysis with Reflex Microscopic  -     Follow Up In Primary Care - Established; Future  Prediabetes  -     Hemoglobin A1C; Future  -     Hemoglobin A1C

## 2025-03-14 ENCOUNTER — OFFICE VISIT (OUTPATIENT)
Dept: PRIMARY CARE | Facility: CLINIC | Age: 50
End: 2025-03-14
Payer: COMMERCIAL

## 2025-03-14 VITALS
WEIGHT: 203.2 LBS | BODY MASS INDEX: 36 KG/M2 | HEART RATE: 60 BPM | OXYGEN SATURATION: 96 % | SYSTOLIC BLOOD PRESSURE: 126 MMHG | DIASTOLIC BLOOD PRESSURE: 88 MMHG

## 2025-03-14 DIAGNOSIS — R73.03 PREDIABETES: ICD-10-CM

## 2025-03-14 DIAGNOSIS — I10 ESSENTIAL HYPERTENSION: Primary | ICD-10-CM

## 2025-03-14 PROCEDURE — 3079F DIAST BP 80-89 MM HG: CPT | Performed by: FAMILY MEDICINE

## 2025-03-14 PROCEDURE — 1036F TOBACCO NON-USER: CPT | Performed by: FAMILY MEDICINE

## 2025-03-14 PROCEDURE — 99214 OFFICE O/P EST MOD 30 MIN: CPT | Performed by: FAMILY MEDICINE

## 2025-03-14 PROCEDURE — 3074F SYST BP LT 130 MM HG: CPT | Performed by: FAMILY MEDICINE

## 2025-03-14 ASSESSMENT — PATIENT HEALTH QUESTIONNAIRE - PHQ9
SUM OF ALL RESPONSES TO PHQ9 QUESTIONS 1 AND 2: 0
2. FEELING DOWN, DEPRESSED OR HOPELESS: NOT AT ALL
1. LITTLE INTEREST OR PLEASURE IN DOING THINGS: NOT AT ALL

## 2025-03-14 ASSESSMENT — PAIN SCALES - GENERAL: PAINLEVEL_OUTOF10: 7

## 2025-03-15 LAB
ALBUMIN/CREAT UR: 4 MG/G CREAT
ANION GAP SERPL CALCULATED.4IONS-SCNC: 12 MMOL/L (CALC) (ref 7–17)
APPEARANCE UR: CLEAR
BACTERIA #/AREA URNS HPF: ABNORMAL /HPF
BILIRUB UR QL STRIP: NEGATIVE
BUN SERPL-MCNC: 17 MG/DL (ref 7–25)
BUN/CREAT SERPL: ABNORMAL (CALC) (ref 6–22)
CALCIUM SERPL-MCNC: 9.1 MG/DL (ref 8.6–10.4)
CAOX CRY #/AREA URNS HPF: ABNORMAL /HPF
CHLORIDE SERPL-SCNC: 104 MMOL/L (ref 98–110)
CO2 SERPL-SCNC: 25 MMOL/L (ref 20–32)
COLOR UR: YELLOW
CREAT SERPL-MCNC: 0.75 MG/DL (ref 0.5–1.03)
CREAT UR-MCNC: 255 MG/DL (ref 20–275)
EGFRCR SERPLBLD CKD-EPI 2021: 97 ML/MIN/1.73M2
EST. AVERAGE GLUCOSE BLD GHB EST-MCNC: 120 MG/DL
EST. AVERAGE GLUCOSE BLD GHB EST-SCNC: 6.6 MMOL/L
GLUCOSE SERPL-MCNC: 105 MG/DL (ref 65–99)
GLUCOSE UR QL STRIP: NEGATIVE
HBA1C MFR BLD: 5.8 % OF TOTAL HGB
HGB UR QL STRIP: NEGATIVE
HYALINE CASTS #/AREA URNS LPF: ABNORMAL /LPF
KETONES UR QL STRIP: NEGATIVE
LEUKOCYTE ESTERASE UR QL STRIP: NEGATIVE
MICROALBUMIN UR-MCNC: 1.1 MG/DL
NITRITE UR QL STRIP: POSITIVE
PH UR STRIP: ABNORMAL [PH] (ref 5–8)
POTASSIUM SERPL-SCNC: 3.9 MMOL/L (ref 3.5–5.3)
PROT UR QL STRIP: ABNORMAL
RBC #/AREA URNS HPF: ABNORMAL /HPF
SERVICE CMNT-IMP: ABNORMAL
SODIUM SERPL-SCNC: 141 MMOL/L (ref 135–146)
SP GR UR STRIP: 1.03 (ref 1–1.03)
SQUAMOUS #/AREA URNS HPF: ABNORMAL /HPF
WBC #/AREA URNS HPF: ABNORMAL /HPF

## 2025-03-15 NOTE — RESULT ENCOUNTER NOTE
UA abnormal: very concentrated so spilling protein; will jamarcus again at next appt: make sure drinking lots of fluids prior to testing  Hga1c shows stable predm; jamarcus 1 yr  Bmp is normal; jamarcus 1yr

## 2025-05-02 DIAGNOSIS — I10 PRIMARY HYPERTENSION: ICD-10-CM

## 2025-05-02 RX ORDER — LISINOPRIL 20 MG/1
20 TABLET ORAL DAILY
Qty: 90 TABLET | Refills: 1 | Status: SHIPPED | OUTPATIENT
Start: 2025-05-02

## 2025-08-26 ENCOUNTER — OFFICE VISIT (OUTPATIENT)
Dept: PRIMARY CARE | Facility: CLINIC | Age: 50
End: 2025-08-26
Payer: COMMERCIAL

## 2025-08-26 VITALS
WEIGHT: 205.2 LBS | DIASTOLIC BLOOD PRESSURE: 84 MMHG | SYSTOLIC BLOOD PRESSURE: 136 MMHG | BODY MASS INDEX: 36.35 KG/M2 | OXYGEN SATURATION: 97 % | HEART RATE: 61 BPM

## 2025-08-26 DIAGNOSIS — I10 ESSENTIAL HYPERTENSION: Primary | ICD-10-CM

## 2025-08-26 DIAGNOSIS — R73.03 PREDIABETES: ICD-10-CM

## 2025-08-26 PROCEDURE — 3079F DIAST BP 80-89 MM HG: CPT | Performed by: FAMILY MEDICINE

## 2025-08-26 PROCEDURE — 1036F TOBACCO NON-USER: CPT | Performed by: FAMILY MEDICINE

## 2025-08-26 PROCEDURE — 99214 OFFICE O/P EST MOD 30 MIN: CPT | Performed by: FAMILY MEDICINE

## 2025-08-26 PROCEDURE — 3078F DIAST BP <80 MM HG: CPT | Performed by: FAMILY MEDICINE

## 2025-08-26 PROCEDURE — 3075F SYST BP GE 130 - 139MM HG: CPT | Performed by: FAMILY MEDICINE

## 2025-08-26 ASSESSMENT — PATIENT HEALTH QUESTIONNAIRE - PHQ9
SUM OF ALL RESPONSES TO PHQ9 QUESTIONS 1 AND 2: 0
1. LITTLE INTEREST OR PLEASURE IN DOING THINGS: NOT AT ALL
2. FEELING DOWN, DEPRESSED OR HOPELESS: NOT AT ALL

## 2025-08-26 ASSESSMENT — PAIN SCALES - GENERAL: PAINLEVEL_OUTOF10: 0-NO PAIN

## 2025-08-27 LAB
APPEARANCE UR: ABNORMAL
BACTERIA #/AREA URNS HPF: ABNORMAL /HPF
BILIRUB UR QL STRIP: NEGATIVE
CAOX CRY #/AREA URNS HPF: ABNORMAL /HPF
COLOR UR: YELLOW
GLUCOSE UR QL STRIP: NEGATIVE
HGB UR QL STRIP: NEGATIVE
HYALINE CASTS #/AREA URNS LPF: ABNORMAL /LPF
KETONES UR QL STRIP: ABNORMAL
LEUKOCYTE ESTERASE UR QL STRIP: ABNORMAL
NITRITE UR QL STRIP: NEGATIVE
PH UR STRIP: ABNORMAL [PH] (ref 5–8)
PROT UR QL STRIP: ABNORMAL
RBC #/AREA URNS HPF: ABNORMAL /HPF
SERVICE CMNT-IMP: ABNORMAL
SP GR UR STRIP: 1.02 (ref 1–1.03)
SQUAMOUS #/AREA URNS HPF: ABNORMAL /HPF
WBC #/AREA URNS HPF: ABNORMAL /HPF

## 2025-08-29 ENCOUNTER — APPOINTMENT (OUTPATIENT)
Dept: PRIMARY CARE | Facility: CLINIC | Age: 50
End: 2025-08-29
Payer: COMMERCIAL

## 2025-08-29 DIAGNOSIS — I10 ESSENTIAL HYPERTENSION: Primary | ICD-10-CM

## 2025-08-29 DIAGNOSIS — R73.03 PREDIABETES: ICD-10-CM
